# Patient Record
Sex: MALE | Race: BLACK OR AFRICAN AMERICAN
[De-identification: names, ages, dates, MRNs, and addresses within clinical notes are randomized per-mention and may not be internally consistent; named-entity substitution may affect disease eponyms.]

---

## 2023-06-16 PROBLEM — Z00.00 ENCOUNTER FOR PREVENTIVE HEALTH EXAMINATION: Status: ACTIVE | Noted: 2023-06-16

## 2023-06-23 ENCOUNTER — NON-APPOINTMENT (OUTPATIENT)
Age: 68
End: 2023-06-23

## 2023-06-23 ENCOUNTER — APPOINTMENT (OUTPATIENT)
Dept: INTERNAL MEDICINE | Facility: CLINIC | Age: 68
End: 2023-06-23
Payer: MEDICARE

## 2023-06-23 VITALS
WEIGHT: 161.5 LBS | HEIGHT: 72 IN | DIASTOLIC BLOOD PRESSURE: 89 MMHG | HEART RATE: 89 BPM | BODY MASS INDEX: 21.88 KG/M2 | SYSTOLIC BLOOD PRESSURE: 145 MMHG | OXYGEN SATURATION: 100 % | RESPIRATION RATE: 16 BRPM

## 2023-06-23 DIAGNOSIS — Z83.3 FAMILY HISTORY OF DIABETES MELLITUS: ICD-10-CM

## 2023-06-23 DIAGNOSIS — Z00.00 ENCOUNTER FOR GENERAL ADULT MEDICAL EXAMINATION W/OUT ABNORMAL FINDINGS: ICD-10-CM

## 2023-06-23 DIAGNOSIS — Z80.42 FAMILY HISTORY OF MALIGNANT NEOPLASM OF PROSTATE: ICD-10-CM

## 2023-06-23 PROCEDURE — G0438: CPT

## 2023-06-23 PROCEDURE — 99213 OFFICE O/P EST LOW 20 MIN: CPT | Mod: 25

## 2023-06-23 PROCEDURE — 36415 COLL VENOUS BLD VENIPUNCTURE: CPT

## 2023-06-23 PROCEDURE — 93000 ELECTROCARDIOGRAM COMPLETE: CPT

## 2023-06-23 NOTE — HEALTH RISK ASSESSMENT
[Good] : ~his/her~  mood as  good [1 or 2 (0 pts)] : 1 or 2 (0 points) [Never (0 pts)] : Never (0 points) [No] : In the past 12 months have you used drugs other than those required for medical reasons? No [No falls in past year] : Patient reported no falls in the past year [0] : 2) Feeling down, depressed, or hopeless: Not at all (0) [PHQ-2 Negative - No further assessment needed] : PHQ-2 Negative - No further assessment needed [With Family] : lives with family [Unemployed] : unemployed [] :  [Smoke Detector] : smoke detector [Carbon Monoxide Detector] : carbon monoxide detector [Seat Belt] :  uses seat belt [Never] : Never [Audit-CScore] : 0 [[id number]] : 0 [HIV test declined] : HIV test declined [Hepatitis C test declined] : Hepatitis C test declined [Fully functional (bathing, dressing, toileting, transferring, walking, feeding)] : Fully functional (bathing, dressing, toileting, transferring, walking, feeding) [Fully functional (using the telephone, shopping, preparing meals, housekeeping, doing laundry, using] : Fully functional and needs no help or supervision to perform IADLs (using the telephone, shopping, preparing meals, housekeeping, doing laundry, using transportation, managing medications and managing finances) [Reviewed no changes] : Reviewed, no changes [Designated Healthcare Proxy] : Designated healthcare proxy [Name: ___] : Health Care Proxy's Name: [unfilled]  [Relationship: ___] : Relationship: [unfilled] [AdvancecareDate] : 06/23/23 [I will adhere to the patient's wishes.] : I will adhere to the patient's wishes.

## 2023-06-23 NOTE — HISTORY OF PRESENT ILLNESS
[de-identified] : Patient comes to the office for the first time for a physical.  No recent changes in exertional or functional ability.  No acute complaints.\par Continues to take medications for diabetes.  Sugar levels usually between 100-140.  Last hemoglobin A1c done 2 months ago was 6.9 at the [facility].\par No acute complaints.\par Patient has scheduled follow-up with ophthalmologist given his past history of bleeding behind the eye.  Also has follow-up for possible cataract surgery.\par

## 2023-06-26 LAB
ALBUMIN SERPL ELPH-MCNC: 4.7 G/DL
ALP BLD-CCNC: 70 U/L
ALT SERPL-CCNC: 12 U/L
ANION GAP SERPL CALC-SCNC: 12 MMOL/L
AST SERPL-CCNC: 15 U/L
BILIRUB SERPL-MCNC: 0.4 MG/DL
BUN SERPL-MCNC: 16 MG/DL
CALCIUM SERPL-MCNC: 9.7 MG/DL
CHLORIDE SERPL-SCNC: 104 MMOL/L
CHOLEST SERPL-MCNC: 192 MG/DL
CO2 SERPL-SCNC: 25 MMOL/L
CREAT SERPL-MCNC: 1.82 MG/DL
EGFR: 40 ML/MIN/1.73M2
ESTIMATED AVERAGE GLUCOSE: 169 MG/DL
GLUCOSE SERPL-MCNC: 176 MG/DL
HBA1C MFR BLD HPLC: 7.5 %
HDLC SERPL-MCNC: 65 MG/DL
LDLC SERPL CALC-MCNC: 113 MG/DL
NONHDLC SERPL-MCNC: 127 MG/DL
POTASSIUM SERPL-SCNC: 4.7 MMOL/L
PROT SERPL-MCNC: 7.5 G/DL
PSA SERPL-MCNC: 1.01 NG/ML
SODIUM SERPL-SCNC: 141 MMOL/L
TRIGL SERPL-MCNC: 72 MG/DL
TSH SERPL-ACNC: 2.07 UIU/ML

## 2023-07-07 ENCOUNTER — APPOINTMENT (OUTPATIENT)
Dept: INTERNAL MEDICINE | Facility: CLINIC | Age: 68
End: 2023-07-07
Payer: MEDICARE

## 2023-07-07 VITALS — SYSTOLIC BLOOD PRESSURE: 120 MMHG | DIASTOLIC BLOOD PRESSURE: 64 MMHG

## 2023-07-07 DIAGNOSIS — M25.512 PAIN IN LEFT SHOULDER: ICD-10-CM

## 2023-07-07 DIAGNOSIS — D64.9 ANEMIA, UNSPECIFIED: ICD-10-CM

## 2023-07-07 PROCEDURE — 99214 OFFICE O/P EST MOD 30 MIN: CPT | Mod: 25

## 2023-07-07 PROCEDURE — 36415 COLL VENOUS BLD VENIPUNCTURE: CPT

## 2023-07-07 RX ORDER — RAMIPRIL 2.5 MG/1
2.5 CAPSULE ORAL
Qty: 90 | Refills: 3 | Status: ACTIVE | COMMUNITY
Start: 2023-07-07 | End: 1900-01-01

## 2023-07-07 NOTE — HISTORY OF PRESENT ILLNESS
[de-identified] : Patient comes for follow-up of abnormal blood test results.  As per patient home sugar monitoring levels are inconsistent with hemoglobin A1c of 7.5.  Patient expected hemoglobin A1c in the mid sixes.  Home sugar monitoring averages about 140-143\par He is not aware of any renal insufficiency that he was told about nor was he ever told about anemia.\par Had colonoscopy in 2021.\par No changes in functional or exertional ability.\par

## 2023-07-08 LAB
ANION GAP SERPL CALC-SCNC: 13 MMOL/L
BUN SERPL-MCNC: 21 MG/DL
CALCIUM SERPL-MCNC: 10 MG/DL
CHLORIDE SERPL-SCNC: 106 MMOL/L
CO2 SERPL-SCNC: 22 MMOL/L
CREAT SERPL-MCNC: 2 MG/DL
EGFR: 36 ML/MIN/1.73M2
FERRITIN SERPL-MCNC: 206 NG/ML
GLUCOSE SERPL-MCNC: 125 MG/DL
IRON SATN MFR SERPL: 18 %
IRON SERPL-MCNC: 59 UG/DL
POTASSIUM SERPL-SCNC: 4.9 MMOL/L
SODIUM SERPL-SCNC: 141 MMOL/L
TIBC SERPL-MCNC: 322 UG/DL
UIBC SERPL-MCNC: 263 UG/DL

## 2023-08-07 ENCOUNTER — NON-APPOINTMENT (OUTPATIENT)
Age: 68
End: 2023-08-07

## 2023-08-21 ENCOUNTER — APPOINTMENT (OUTPATIENT)
Dept: INTERNAL MEDICINE | Facility: CLINIC | Age: 68
End: 2023-08-21
Payer: MEDICARE

## 2023-08-21 VITALS
OXYGEN SATURATION: 99 % | RESPIRATION RATE: 16 BRPM | HEIGHT: 72 IN | WEIGHT: 161 LBS | BODY MASS INDEX: 21.81 KG/M2 | HEART RATE: 70 BPM | SYSTOLIC BLOOD PRESSURE: 143 MMHG | DIASTOLIC BLOOD PRESSURE: 81 MMHG

## 2023-08-21 DIAGNOSIS — N28.9 DISORDER OF KIDNEY AND URETER, UNSPECIFIED: ICD-10-CM

## 2023-08-21 DIAGNOSIS — E11.65 TYPE 2 DIABETES MELLITUS WITH HYPERGLYCEMIA: ICD-10-CM

## 2023-08-21 PROCEDURE — 36415 COLL VENOUS BLD VENIPUNCTURE: CPT

## 2023-08-21 PROCEDURE — 99213 OFFICE O/P EST LOW 20 MIN: CPT | Mod: 25

## 2023-08-21 RX ORDER — SITAGLIPTIN 100 MG/1
100 TABLET, FILM COATED ORAL
Qty: 90 | Refills: 3 | Status: ACTIVE | COMMUNITY

## 2023-08-21 RX ORDER — METFORMIN HYDROCHLORIDE 1000 MG/1
1000 TABLET, COATED ORAL TWICE DAILY
Qty: 180 | Refills: 3 | Status: ACTIVE | COMMUNITY

## 2023-08-21 RX ORDER — GLIPIZIDE 5 MG/1
5 TABLET, FILM COATED, EXTENDED RELEASE ORAL
Qty: 270 | Refills: 3 | Status: ACTIVE | COMMUNITY
Start: 2023-08-21 | End: 1900-01-01

## 2023-08-21 NOTE — HISTORY OF PRESENT ILLNESS
[de-identified] : Patient comes for follow-up of diabetes and increasing renal insufficiency.  Overall no changes in exertional or functional ability.  Findings continuous glucose monitor approved from insurance company.  Taking all medication as advised. No acute complaints.

## 2023-08-22 LAB
ANION GAP SERPL CALC-SCNC: 13 MMOL/L
BUN SERPL-MCNC: 13 MG/DL
CALCIUM SERPL-MCNC: 9.3 MG/DL
CHLORIDE SERPL-SCNC: 106 MMOL/L
CO2 SERPL-SCNC: 24 MMOL/L
CREAT SERPL-MCNC: 1.8 MG/DL
EGFR: 41 ML/MIN/1.73M2
GLUCOSE SERPL-MCNC: 169 MG/DL
POTASSIUM SERPL-SCNC: 5 MMOL/L
SODIUM SERPL-SCNC: 142 MMOL/L

## 2023-08-29 DIAGNOSIS — E16.2 HYPOGLYCEMIA, UNSPECIFIED: ICD-10-CM

## 2023-08-30 ENCOUNTER — OFFICE (OUTPATIENT)
Dept: URBAN - METROPOLITAN AREA CLINIC 29 | Facility: CLINIC | Age: 68
Setting detail: OPHTHALMOLOGY
End: 2023-08-30
Payer: MEDICARE

## 2023-08-30 ENCOUNTER — RX ONLY (RX ONLY)
Age: 68
End: 2023-08-30

## 2023-08-30 DIAGNOSIS — H25.13: ICD-10-CM

## 2023-08-30 DIAGNOSIS — E11.3393: ICD-10-CM

## 2023-08-30 PROCEDURE — 92004 COMPRE OPH EXAM NEW PT 1/>: CPT | Performed by: OPHTHALMOLOGY

## 2023-08-30 ASSESSMENT — AXIALLENGTH_DERIVED
OD_AL: 24.4543
OS_AL: 25.0047
OS_AL: 25.6213
OD_AL: 24.7188

## 2023-08-30 ASSESSMENT — REFRACTION_CURRENTRX
OS_SPHERE: -4.25
OS_ADD: +2.25
OD_AXIS: 175
OD_CYLINDER: +4.25
OS_OVR_VA: 20/
OS_CYLINDER: +4.25
OD_OVR_VA: 20/
OD_SPHERE: -3.00
OD_ADD: +2.25
OS_AXIS: 176

## 2023-08-30 ASSESSMENT — REFRACTION_MANIFEST
OS_CYLINDER: +4.25
OS_ADD: +2.25
OS_AXIS: 176
OD_ADD: +2.25
OS_VA1: 20/100
OD_VA1: 20/40
OD_SPHERE: -3.00
OD_AXIS: 175
OD_CYLINDER: +4.25
OS_SPHERE: -4.25

## 2023-08-30 ASSESSMENT — REFRACTION_AUTOREFRACTION
OD_AXIS: 004
OD_CYLINDER: +4.50
OD_SPHERE: -2.50
OS_AXIS: 004
OS_CYLINDER: +4.00
OS_SPHERE: -5.50

## 2023-08-30 ASSESSMENT — SPHEQUIV_DERIVED
OD_SPHEQUIV: -0.25
OD_SPHEQUIV: -0.875
OS_SPHEQUIV: -3.5
OS_SPHEQUIV: -2.125

## 2023-08-30 ASSESSMENT — KERATOMETRY
OD_K2POWER_DIOPTERS: 43.25
OS_K1POWER_DIOPTERS: 40.50
OS_AXISANGLE_DEGREES: 178
OD_AXISANGLE_DEGREES: 004
OD_K1POWER_DIOPTERS: 39.75
OS_K2POWER_DIOPTERS: 43.75

## 2023-08-30 ASSESSMENT — TONOMETRY
OS_IOP_MMHG: 16
OD_IOP_MMHG: 16

## 2023-08-30 ASSESSMENT — CONFRONTATIONAL VISUAL FIELD TEST (CVF)
OS_FINDINGS: FULL
OD_FINDINGS: FULL

## 2023-08-30 ASSESSMENT — VISUAL ACUITY
OS_BCVA: 20/40-2
OD_BCVA: 20/100-2

## 2023-09-07 RX ORDER — BLOOD-GLUCOSE SENSOR
EACH MISCELLANEOUS
Qty: 2 | Refills: 5 | Status: ACTIVE | COMMUNITY
Start: 2023-07-07 | End: 1900-01-01

## 2023-10-20 ENCOUNTER — APPOINTMENT (OUTPATIENT)
Dept: INTERNAL MEDICINE | Facility: CLINIC | Age: 68
End: 2023-10-20

## 2024-01-09 ENCOUNTER — NON-APPOINTMENT (OUTPATIENT)
Age: 69
End: 2024-01-09

## 2024-01-11 ENCOUNTER — OFFICE (OUTPATIENT)
Dept: URBAN - METROPOLITAN AREA CLINIC 29 | Facility: CLINIC | Age: 69
Setting detail: OPHTHALMOLOGY
End: 2024-01-11
Payer: MEDICARE

## 2024-01-11 DIAGNOSIS — H25.13: ICD-10-CM

## 2024-01-11 DIAGNOSIS — H25.12: ICD-10-CM

## 2024-01-11 DIAGNOSIS — E11.3393: ICD-10-CM

## 2024-01-11 PROCEDURE — 92134 CPTRZ OPH DX IMG PST SGM RTA: CPT | Performed by: OPHTHALMOLOGY

## 2024-01-11 PROCEDURE — 99213 OFFICE O/P EST LOW 20 MIN: CPT | Performed by: OPHTHALMOLOGY

## 2024-01-11 PROCEDURE — 92136 OPHTHALMIC BIOMETRY: CPT | Mod: LT | Performed by: OPHTHALMOLOGY

## 2024-01-11 PROCEDURE — 92136 OPHTHALMIC BIOMETRY: CPT | Mod: TC | Performed by: OPHTHALMOLOGY

## 2024-01-11 ASSESSMENT — CONFRONTATIONAL VISUAL FIELD TEST (CVF)
OD_FINDINGS: FULL
OS_FINDINGS: FULL

## 2024-01-11 ASSESSMENT — REFRACTION_MANIFEST
OD_SPHERE: -3.00
OS_SPHERE: -4.25
OS_VA1: 20/100
OD_ADD: +2.25
OD_AXIS: 175
OS_CYLINDER: +4.25
OD_VA1: 20/40
OS_ADD: +2.25
OS_AXIS: 176
OD_CYLINDER: +4.25

## 2024-01-11 ASSESSMENT — REFRACTION_AUTOREFRACTION
OD_SPHERE: -2.50
OD_CYLINDER: +4.50
OS_CYLINDER: +4.00
OS_SPHERE: -5.50
OS_AXIS: 004
OD_AXIS: 004

## 2024-01-11 ASSESSMENT — REFRACTION_CURRENTRX
OS_OVR_VA: 20/
OD_CYLINDER: +4.25
OD_OVR_VA: 20/
OS_ADD: +2.25
OS_AXIS: 176
OD_ADD: +2.25
OS_SPHERE: -4.25
OS_CYLINDER: +4.25
OD_AXIS: 175
OD_SPHERE: -3.00

## 2024-01-11 ASSESSMENT — SPHEQUIV_DERIVED
OD_SPHEQUIV: -0.25
OD_SPHEQUIV: -0.875
OS_SPHEQUIV: -2.125
OS_SPHEQUIV: -3.5

## 2024-03-12 ENCOUNTER — AMBULATORY SURGERY CENTER (OUTPATIENT)
Dept: URBAN - METROPOLITAN AREA SURGERY 17 | Facility: SURGERY | Age: 69
Setting detail: OPHTHALMOLOGY
End: 2024-03-12
Payer: MEDICARE

## 2024-03-12 DIAGNOSIS — H25.12: ICD-10-CM

## 2024-03-12 PROCEDURE — 66984 XCAPSL CTRC RMVL W/O ECP: CPT | Mod: LT | Performed by: OPHTHALMOLOGY

## 2024-03-13 ENCOUNTER — RX ONLY (RX ONLY)
Age: 69
End: 2024-03-13

## 2024-03-13 ENCOUNTER — OFFICE (OUTPATIENT)
Dept: URBAN - METROPOLITAN AREA CLINIC 29 | Facility: CLINIC | Age: 69
Setting detail: OPHTHALMOLOGY
End: 2024-03-13
Payer: MEDICARE

## 2024-03-13 DIAGNOSIS — Z96.1: ICD-10-CM

## 2024-03-13 DIAGNOSIS — E11.3393: ICD-10-CM

## 2024-03-13 DIAGNOSIS — H25.11: ICD-10-CM

## 2024-03-13 PROCEDURE — 92136 OPHTHALMIC BIOMETRY: CPT | Mod: 26,RT | Performed by: OPHTHALMOLOGY

## 2024-03-13 PROCEDURE — 99024 POSTOP FOLLOW-UP VISIT: CPT | Performed by: OPHTHALMOLOGY

## 2024-03-13 ASSESSMENT — REFRACTION_MANIFEST
OD_CYLINDER: +4.25
OS_ADD: +2.25
OS_CYLINDER: +4.25
OS_VA1: 20/100
OD_SPHERE: -3.00
OD_ADD: +2.25
OS_AXIS: 176
OD_AXIS: 175
OS_SPHERE: -4.25
OD_VA1: 20/40

## 2024-03-13 ASSESSMENT — REFRACTION_CURRENTRX
OS_ADD: +2.25
OS_SPHERE: -4.25
OD_OVR_VA: 20/
OD_SPHERE: -3.00
OD_AXIS: 175
OS_CYLINDER: +4.25
OD_ADD: +2.25
OD_CYLINDER: +4.25
OS_OVR_VA: 20/
OS_AXIS: 176

## 2024-03-13 ASSESSMENT — SPHEQUIV_DERIVED
OD_SPHEQUIV: -0.875
OS_SPHEQUIV: -2.125

## 2024-03-19 ENCOUNTER — OFFICE (OUTPATIENT)
Dept: URBAN - METROPOLITAN AREA CLINIC 29 | Facility: CLINIC | Age: 69
Setting detail: OPHTHALMOLOGY
End: 2024-03-19
Payer: MEDICARE

## 2024-03-19 DIAGNOSIS — H25.11: ICD-10-CM

## 2024-03-19 DIAGNOSIS — E11.3393: ICD-10-CM

## 2024-03-19 DIAGNOSIS — Z96.1: ICD-10-CM

## 2024-03-19 PROCEDURE — 99024 POSTOP FOLLOW-UP VISIT: CPT | Performed by: OPHTHALMOLOGY

## 2024-03-19 ASSESSMENT — REFRACTION_CURRENTRX
OS_OVR_VA: 20/
OD_CYLINDER: +4.00
OS_AXIS: 174
OD_AXIS: 175
OD_ADD: +2.50
OS_ADD: +2.50
OS_SPHERE: -4.25
OD_SPHERE: -3.00
OS_CYLINDER: +4.25
OD_OVR_VA: 20/

## 2024-03-19 ASSESSMENT — REFRACTION_MANIFEST
OD_SPHERE: -3.00
OS_AXIS: 176
OS_VA1: 20/100+
OS_SPHERE: -4.25
OD_ADD: +2.25
OS_CYLINDER: +3.75
OS_VA1: 20/100
OD_CYLINDER: +4.25
OS_CYLINDER: +4.25
OD_VA1: 20/40
OD_AXIS: 175
OS_ADD: +2.25
OS_SPHERE: -1.75
OS_AXIS: 175

## 2024-03-19 ASSESSMENT — SPHEQUIV_DERIVED
OD_SPHEQUIV: -0.875
OS_SPHEQUIV: 0.125
OS_SPHEQUIV: -2.125

## 2024-03-20 ENCOUNTER — OFFICE (OUTPATIENT)
Dept: URBAN - METROPOLITAN AREA CLINIC 29 | Facility: CLINIC | Age: 69
Setting detail: OPHTHALMOLOGY
End: 2024-03-20
Payer: MEDICARE

## 2024-03-20 DIAGNOSIS — E11.3512: ICD-10-CM

## 2024-03-20 DIAGNOSIS — H34.8320: ICD-10-CM

## 2024-03-20 DIAGNOSIS — E11.3551: ICD-10-CM

## 2024-03-20 PROBLEM — Z96.1 PSEUDOPHAKIA ; LEFT EYE: Status: ACTIVE | Noted: 2024-03-13

## 2024-03-20 PROBLEM — H25.11 CATARACT SENILE NUCLEAR SCLEROSIS;  , RIGHT EYE: Status: ACTIVE | Noted: 2024-03-13

## 2024-03-20 PROCEDURE — 67028 INJECTION EYE DRUG: CPT | Mod: 79,LT | Performed by: OPHTHALMOLOGY

## 2024-03-20 PROCEDURE — 99213 OFFICE O/P EST LOW 20 MIN: CPT | Mod: 24,25 | Performed by: OPHTHALMOLOGY

## 2024-03-20 PROCEDURE — 92235 FLUORESCEIN ANGRPH MLTIFRAME: CPT | Performed by: OPHTHALMOLOGY

## 2024-03-20 PROCEDURE — 92134 CPTRZ OPH DX IMG PST SGM RTA: CPT | Performed by: OPHTHALMOLOGY

## 2024-03-21 ASSESSMENT — SPHEQUIV_DERIVED
OS_SPHEQUIV: 0.125
OS_SPHEQUIV: -2.125
OD_SPHEQUIV: -0.875

## 2024-03-21 ASSESSMENT — REFRACTION_CURRENTRX
OS_SPHERE: -4.25
OS_ADD: +2.50
OS_CYLINDER: +4.25
OD_SPHERE: -3.00
OD_ADD: +2.50
OS_AXIS: 174
OD_AXIS: 175
OD_OVR_VA: 20/
OS_OVR_VA: 20/
OD_CYLINDER: +4.00

## 2024-03-21 ASSESSMENT — REFRACTION_MANIFEST
OS_CYLINDER: +4.25
OD_VA1: 20/40
OD_AXIS: 175
OS_ADD: +2.25
OS_SPHERE: -4.25
OS_CYLINDER: +3.75
OD_CYLINDER: +4.25
OD_SPHERE: -3.00
OD_ADD: +2.25
OS_AXIS: 175
OS_VA1: 20/100+
OS_AXIS: 176
OS_VA1: 20/100
OS_SPHERE: -1.75

## 2024-04-10 ENCOUNTER — OFFICE (OUTPATIENT)
Dept: URBAN - METROPOLITAN AREA CLINIC 29 | Facility: CLINIC | Age: 69
Setting detail: OPHTHALMOLOGY
End: 2024-04-10
Payer: MEDICARE

## 2024-04-10 DIAGNOSIS — H25.11: ICD-10-CM

## 2024-04-10 DIAGNOSIS — Z96.1: ICD-10-CM

## 2024-04-10 PROCEDURE — 99024 POSTOP FOLLOW-UP VISIT: CPT | Performed by: OPHTHALMOLOGY

## 2024-04-15 ENCOUNTER — OFFICE (OUTPATIENT)
Dept: URBAN - METROPOLITAN AREA CLINIC 29 | Facility: CLINIC | Age: 69
Setting detail: OPHTHALMOLOGY
End: 2024-04-15
Payer: MEDICARE

## 2024-04-15 DIAGNOSIS — H34.8320: ICD-10-CM

## 2024-04-15 DIAGNOSIS — E11.3512: ICD-10-CM

## 2024-04-15 DIAGNOSIS — E11.3551: ICD-10-CM

## 2024-04-15 PROCEDURE — 67210 TREATMENT OF RETINAL LESION: CPT | Mod: 79,LT | Performed by: OPHTHALMOLOGY

## 2024-04-15 PROCEDURE — 92134 CPTRZ OPH DX IMG PST SGM RTA: CPT | Performed by: OPHTHALMOLOGY

## 2024-05-15 ENCOUNTER — OFFICE (OUTPATIENT)
Dept: URBAN - METROPOLITAN AREA CLINIC 29 | Facility: CLINIC | Age: 69
Setting detail: OPHTHALMOLOGY
End: 2024-05-15
Payer: MEDICARE

## 2024-05-15 DIAGNOSIS — E11.3512: ICD-10-CM

## 2024-05-15 DIAGNOSIS — H34.8320: ICD-10-CM

## 2024-05-15 PROCEDURE — 92134 CPTRZ OPH DX IMG PST SGM RTA: CPT | Performed by: OPHTHALMOLOGY

## 2024-05-15 PROCEDURE — 67028 INJECTION EYE DRUG: CPT | Mod: 79,LT | Performed by: OPHTHALMOLOGY

## 2024-05-15 ASSESSMENT — CONFRONTATIONAL VISUAL FIELD TEST (CVF)
OS_FINDINGS: FULL
OD_FINDINGS: FULL

## 2024-06-21 ENCOUNTER — OFFICE (OUTPATIENT)
Dept: URBAN - METROPOLITAN AREA CLINIC 29 | Facility: CLINIC | Age: 69
Setting detail: OPHTHALMOLOGY
End: 2024-06-21
Payer: MEDICARE

## 2024-06-21 DIAGNOSIS — E11.3512: ICD-10-CM

## 2024-06-21 DIAGNOSIS — H25.11: ICD-10-CM

## 2024-06-21 DIAGNOSIS — H34.8320: ICD-10-CM

## 2024-06-21 PROCEDURE — 99213 OFFICE O/P EST LOW 20 MIN: CPT | Mod: 24,25 | Performed by: OPHTHALMOLOGY

## 2024-06-21 PROCEDURE — 92250 FUNDUS PHOTOGRAPHY W/I&R: CPT | Performed by: OPHTHALMOLOGY

## 2024-06-21 PROCEDURE — 67028 INJECTION EYE DRUG: CPT | Mod: 58,LT | Performed by: OPHTHALMOLOGY

## 2024-06-21 ASSESSMENT — CONFRONTATIONAL VISUAL FIELD TEST (CVF)
OD_FINDINGS: FULL
OS_FINDINGS: FULL

## 2024-08-02 ENCOUNTER — OFFICE (OUTPATIENT)
Dept: URBAN - METROPOLITAN AREA CLINIC 29 | Facility: CLINIC | Age: 69
Setting detail: OPHTHALMOLOGY
End: 2024-08-02
Payer: MEDICARE

## 2024-08-02 DIAGNOSIS — H25.11: ICD-10-CM

## 2024-08-02 DIAGNOSIS — E11.3512: ICD-10-CM

## 2024-08-02 DIAGNOSIS — H34.8320: ICD-10-CM

## 2024-08-02 PROCEDURE — 67028 INJECTION EYE DRUG: CPT | Mod: LT | Performed by: OPHTHALMOLOGY

## 2024-08-02 PROCEDURE — 92134 CPTRZ OPH DX IMG PST SGM RTA: CPT | Performed by: OPHTHALMOLOGY

## 2024-08-02 PROCEDURE — 99213 OFFICE O/P EST LOW 20 MIN: CPT | Mod: 25 | Performed by: OPHTHALMOLOGY

## 2024-08-02 ASSESSMENT — CONFRONTATIONAL VISUAL FIELD TEST (CVF)
OD_FINDINGS: FULL
OS_FINDINGS: FULL

## 2024-08-08 ENCOUNTER — OFFICE (OUTPATIENT)
Dept: URBAN - METROPOLITAN AREA CLINIC 29 | Facility: CLINIC | Age: 69
Setting detail: OPHTHALMOLOGY
End: 2024-08-08
Payer: MEDICARE

## 2024-08-08 DIAGNOSIS — H25.11: ICD-10-CM

## 2024-08-08 PROCEDURE — 92012 INTRM OPH EXAM EST PATIENT: CPT | Performed by: OPTOMETRIST

## 2024-08-08 ASSESSMENT — CONFRONTATIONAL VISUAL FIELD TEST (CVF)
OS_FINDINGS: FULL
OD_FINDINGS: FULL

## 2024-09-20 ENCOUNTER — OFFICE (OUTPATIENT)
Dept: URBAN - METROPOLITAN AREA CLINIC 29 | Facility: CLINIC | Age: 69
Setting detail: OPHTHALMOLOGY
End: 2024-09-20
Payer: MEDICARE

## 2024-09-20 DIAGNOSIS — H34.8320: ICD-10-CM

## 2024-09-20 DIAGNOSIS — E11.3512: ICD-10-CM

## 2024-09-20 PROCEDURE — 92134 CPTRZ OPH DX IMG PST SGM RTA: CPT | Performed by: OPHTHALMOLOGY

## 2024-09-20 PROCEDURE — 67028 INJECTION EYE DRUG: CPT | Mod: LT | Performed by: OPHTHALMOLOGY

## 2024-09-20 ASSESSMENT — CONFRONTATIONAL VISUAL FIELD TEST (CVF)
OS_FINDINGS: FULL
OD_FINDINGS: FULL

## 2024-10-30 ENCOUNTER — OFFICE (OUTPATIENT)
Dept: URBAN - METROPOLITAN AREA CLINIC 29 | Facility: CLINIC | Age: 69
Setting detail: OPHTHALMOLOGY
End: 2024-10-30
Payer: MEDICARE

## 2024-10-30 DIAGNOSIS — H01.002: ICD-10-CM

## 2024-10-30 DIAGNOSIS — H26.492: ICD-10-CM

## 2024-10-30 DIAGNOSIS — H40.003: ICD-10-CM

## 2024-10-30 DIAGNOSIS — H16.223: ICD-10-CM

## 2024-10-30 DIAGNOSIS — H25.11: ICD-10-CM

## 2024-10-30 DIAGNOSIS — H11.153: ICD-10-CM

## 2024-10-30 DIAGNOSIS — E11.3551: ICD-10-CM

## 2024-10-30 DIAGNOSIS — H01.005: ICD-10-CM

## 2024-10-30 DIAGNOSIS — E11.3512: ICD-10-CM

## 2024-10-30 DIAGNOSIS — H34.8320: ICD-10-CM

## 2024-10-30 PROCEDURE — 76514 ECHO EXAM OF EYE THICKNESS: CPT | Performed by: OPHTHALMOLOGY

## 2024-10-30 PROCEDURE — 92014 COMPRE OPH EXAM EST PT 1/>: CPT | Performed by: OPHTHALMOLOGY

## 2024-10-30 PROCEDURE — 92133 CPTRZD OPH DX IMG PST SGM ON: CPT | Performed by: OPHTHALMOLOGY

## 2024-10-30 PROCEDURE — 92083 EXTENDED VISUAL FIELD XM: CPT | Performed by: OPHTHALMOLOGY

## 2024-10-30 PROCEDURE — 92020 GONIOSCOPY: CPT | Performed by: OPHTHALMOLOGY

## 2024-10-30 ASSESSMENT — REFRACTION_CURRENTRX
OD_OVR_VA: 20/
OS_AXIS: 003
OS_OVR_VA: 20/
OD_CYLINDER: +4.00
OD_CYLINDER: +3.25
OS_SPHERE: -2.00
OS_SPHERE: -4.25
OD_CYLINDER: +4.25
OS_CYLINDER: +4.25
OS_ADD: +2.50
OD_AXIS: 178
OD_OVR_VA: 20/
OS_SPHERE: -4.25
OS_CYLINDER: +3.50
OS_AXIS: 176
OS_AXIS: 174
OD_ADD: +2.50
OD_ADD: +2.50
OD_OVR_VA: 20/
OD_AXIS: 175
OS_CYLINDER: +4.25
OD_ADD: +3.50
OS_ADD: +2.50
OS_ADD: +3.50
OD_SPHERE: -2.75
OD_SPHERE: -3.00
OD_AXIS: 174
OS_OVR_VA: 20/
OS_OVR_VA: 20/
OD_SPHERE: -3.00

## 2024-10-30 ASSESSMENT — REFRACTION_MANIFEST
OD_SPHERE: -3.00
OD_ADD: +2.25
OS_CYLINDER: +3.50
OD_CYLINDER: +4.25
OS_CYLINDER: +4.25
OS_SPHERE: -2.00
OS_AXIS: 175
OS_VA1: 20/100+
OS_AXIS: 175
OS_CYLINDER: +3.75
OS_AXIS: 176
OS_CYLINDER: +4.25
OS_ADD: +2.25
OS_SPHERE: -4.25
OD_AXIS: 005
OS_VA1: 20/100
OD_SPHERE: -2.50
OD_AXIS: 175
OS_VA1: 20/150
OS_ADD: +3.00
OD_CYLINDER: +3.50
OS_SPHERE: -1.75
OD_VA1: 20/40
OD_ADD: +3.00
OS_VA1: CF
OS_AXIS: 180
OS_SPHERE: -2.00
OD_VA1: 20/50-2

## 2024-10-30 ASSESSMENT — KERATOMETRY
OS_AXISANGLE_DEGREES: 178
OS_K1POWER_DIOPTERS: 40.50
OS_K2POWER_DIOPTERS: 43.75
OD_K1POWER_DIOPTERS: 39.75
OD_AXISANGLE_DEGREES: 004
OD_K2POWER_DIOPTERS: 43.25

## 2024-10-30 ASSESSMENT — TONOMETRY
OD_IOP_MMHG: 18
OS_IOP_MMHG: 18

## 2024-10-30 ASSESSMENT — VISUAL ACUITY
OS_BCVA: 20/40+2
OD_BCVA: CF 2FT

## 2024-10-30 ASSESSMENT — PACHYMETRY
OS_CT_UM: 560
OD_CT_UM: 546
OS_CT_CORRECTION: -1
OD_CT_CORRECTION: 0

## 2024-10-30 ASSESSMENT — REFRACTION_AUTOREFRACTION
OD_AXIS: 179
OS_CYLINDER: +3.50
OD_SPHERE: -2.75
OS_AXIS: 177
OD_CYLINDER: +4.25
OS_SPHERE: -1.75

## 2024-10-30 ASSESSMENT — SUPERFICIAL PUNCTATE KERATITIS (SPK)
OS_SPK: T
OD_SPK: T

## 2024-10-30 ASSESSMENT — CONFRONTATIONAL VISUAL FIELD TEST (CVF)
OD_FINDINGS: FULL
OS_FINDINGS: CONSTRICTION

## 2024-10-30 ASSESSMENT — LID EXAM ASSESSMENTS
OS_BLEPHARITIS: LLL 1+ 2+
OD_BLEPHARITIS: RLL 1+ 2+

## 2024-11-11 ENCOUNTER — OFFICE (OUTPATIENT)
Dept: URBAN - METROPOLITAN AREA CLINIC 29 | Facility: CLINIC | Age: 69
Setting detail: OPHTHALMOLOGY
End: 2024-11-11
Payer: MEDICARE

## 2024-11-11 DIAGNOSIS — H34.8320: ICD-10-CM

## 2024-11-11 DIAGNOSIS — H01.002: ICD-10-CM

## 2024-11-11 DIAGNOSIS — E11.3512: ICD-10-CM

## 2024-11-11 PROCEDURE — 92134 CPTRZ OPH DX IMG PST SGM RTA: CPT | Performed by: OPHTHALMOLOGY

## 2024-11-11 PROCEDURE — 99213 OFFICE O/P EST LOW 20 MIN: CPT | Mod: 25 | Performed by: OPHTHALMOLOGY

## 2024-11-11 PROCEDURE — 67028 INJECTION EYE DRUG: CPT | Mod: LT | Performed by: OPHTHALMOLOGY

## 2024-11-11 ASSESSMENT — REFRACTION_CURRENTRX
OS_ADD: +2.50
OD_CYLINDER: +3.25
OS_SPHERE: -4.25
OD_OVR_VA: 20/
OS_ADD: +3.50
OD_ADD: +3.50
OD_CYLINDER: +4.25
OS_SPHERE: -4.25
OS_OVR_VA: 20/
OS_CYLINDER: +4.25
OD_ADD: +2.50
OD_SPHERE: -3.00
OS_ADD: +2.50
OD_ADD: +2.50
OD_CYLINDER: +4.00
OD_AXIS: 178
OD_AXIS: 175
OS_AXIS: 176
OS_SPHERE: -2.00
OS_AXIS: 003
OD_OVR_VA: 20/
OS_OVR_VA: 20/
OS_OVR_VA: 20/
OD_AXIS: 174
OD_SPHERE: -3.00
OS_CYLINDER: +4.25
OD_OVR_VA: 20/
OS_CYLINDER: +3.50
OS_AXIS: 174
OD_SPHERE: -2.75

## 2024-11-11 ASSESSMENT — REFRACTION_MANIFEST
OD_AXIS: 005
OS_AXIS: 176
OD_CYLINDER: +3.50
OS_AXIS: 180
OS_CYLINDER: +4.25
OS_SPHERE: -1.75
OD_AXIS: 175
OD_VA1: 20/50-2
OD_ADD: +2.25
OS_SPHERE: -2.00
OS_CYLINDER: +3.75
OS_CYLINDER: +4.25
OS_CYLINDER: +3.50
OS_AXIS: 175
OS_AXIS: 175
OD_SPHERE: -2.50
OS_VA1: CF
OS_VA1: 20/100
OD_ADD: +3.00
OD_VA1: 20/40
OS_SPHERE: -4.25
OS_SPHERE: -2.00
OS_ADD: +2.25
OD_SPHERE: -3.00
OS_VA1: 20/150
OD_CYLINDER: +4.25
OS_ADD: +3.00
OS_VA1: 20/100+

## 2024-11-11 ASSESSMENT — CONFRONTATIONAL VISUAL FIELD TEST (CVF)
OD_FINDINGS: FULL
OS_FINDINGS: FULL

## 2024-11-11 ASSESSMENT — KERATOMETRY
OS_AXISANGLE_DEGREES: 178
OD_AXISANGLE_DEGREES: 004
OS_K1POWER_DIOPTERS: 40.50
OD_K2POWER_DIOPTERS: 43.25
OD_K1POWER_DIOPTERS: 39.75
OS_K2POWER_DIOPTERS: 43.75

## 2024-11-11 ASSESSMENT — LID EXAM ASSESSMENTS
OS_BLEPHARITIS: LLL 1+ 2+
OD_BLEPHARITIS: RLL 1+ 2+

## 2024-11-11 ASSESSMENT — SUPERFICIAL PUNCTATE KERATITIS (SPK)
OS_SPK: T
OD_SPK: T

## 2024-11-11 ASSESSMENT — REFRACTION_AUTOREFRACTION
OD_SPHERE: -2.75
OS_SPHERE: -1.75
OD_CYLINDER: +4.25
OS_AXIS: 177
OD_AXIS: 179
OS_CYLINDER: +3.50

## 2024-11-11 ASSESSMENT — VISUAL ACUITY
OD_BCVA: CF 5FT
OS_BCVA: 20/30-2